# Patient Record
Sex: MALE | Race: WHITE | NOT HISPANIC OR LATINO
[De-identification: names, ages, dates, MRNs, and addresses within clinical notes are randomized per-mention and may not be internally consistent; named-entity substitution may affect disease eponyms.]

---

## 2019-03-14 ENCOUNTER — APPOINTMENT (OUTPATIENT)
Dept: UROLOGY | Facility: CLINIC | Age: 74
End: 2019-03-14
Payer: COMMERCIAL

## 2019-03-14 VITALS
WEIGHT: 181 LBS | DIASTOLIC BLOOD PRESSURE: 78 MMHG | TEMPERATURE: 97.7 F | BODY MASS INDEX: 30.9 KG/M2 | SYSTOLIC BLOOD PRESSURE: 162 MMHG | HEIGHT: 64 IN | HEART RATE: 69 BPM

## 2019-03-14 DIAGNOSIS — Z87.891 PERSONAL HISTORY OF NICOTINE DEPENDENCE: ICD-10-CM

## 2019-03-14 PROCEDURE — 99204 OFFICE O/P NEW MOD 45 MIN: CPT

## 2019-03-14 RX ORDER — SIMVASTATIN 80 MG/1
TABLET, FILM COATED ORAL
Refills: 0 | Status: ACTIVE | COMMUNITY

## 2019-03-15 LAB — BACTERIA UR CULT: NORMAL

## 2019-03-24 NOTE — ADDENDUM
[FreeTextEntry1] : Entered by Pam Chino, acting as scribe for Dr. Muñoz. \par \par The documentation recorded by the scribe accurately reflects the service I personally performed and the decisions made by me.\par

## 2019-03-24 NOTE — LETTER BODY
[FreeTextEntry1] : Jerome White MD. \par [address]\[place] [address]\par [phone]\par \par Dear Dr. White,\par \par Reason for Visit: Elevated PSA.\par \par This is a 73 -year-old retired gentleman with elevated PSA. Patient reports that he has not had previous prostate biopsy. His current PSA is 4.94. He reports a fast urinary flow. Patient denies any hematuria or lower urinary tract symptoms. He denies any pain.The patient denies any aggravating or relieving factors. The patient denies any interference of function. The patient is entirely asymptomatic. All other review of systems are negative. He has no cancer in his family medical history. He has previous surgical history. Past medical history, family history and social history were inquired and were non contributory to current condition. The patient was a former smoker. The patient drinks alcohol occasionally. Medications and allergies were reviewed. Patient is allergic to Penicillin and Sulfa. \par \par On examination, the patient is a healthy-appearing gentleman in no acute distress. He is alert and oriented follows commands. He has normal mood and affect. He is normocephalic. Neck is supple. Oral no thrush. Respirations are unlabored. His abdomen is soft and nontender. Bladder is nonpalpable. No CVA tenderness. Neurologically he is grossly intact. No peripheral edema. Skin without gross abnormality. He has normal male external genitalia. Normal meatus. Bilateral testes are descended intrascrotally and normal to palpation. On rectal examination, there is normal sphincter tone. The prostate is clinically benign without focal induration or nodularity.\par \par His current PSA is 4.94, which is elevated. Previous PSA was 3.72. CMP demonstrated normal renal function, creatinine 1.11.\par \par Assessment: Elevated PSA.\par \par I counseled the patient. I discussed with him the risk of occult malignancy. I discussed the various etiologies of PSA elevation, including enlargement of prostate, inflammation or infection, and prostate cancer. He will obtain ALP, BMP, and urine culture today to ensure stability. I recommended he repeat PSA to confirm the diagnosis. If his PSA remains elevated, then he may then consider a prostate MRI or biopsy. Risks and alternatives were discussed. I answered the patient questions. The patient will follow-up as directed and will contact me with any questions or concerns.Thank you for the opportunity to participate in the care of this patient. I'll keep you updated on his progress.\par \par Plan: ALP. BMP. PSA. Urine culture. Follow up in 6 months.

## 2019-03-28 ENCOUNTER — TRANSCRIPTION ENCOUNTER (OUTPATIENT)
Age: 74
End: 2019-03-28

## 2019-08-08 ENCOUNTER — APPOINTMENT (OUTPATIENT)
Dept: UROLOGY | Facility: CLINIC | Age: 74
End: 2019-08-08
Payer: COMMERCIAL

## 2019-08-08 PROCEDURE — 99213 OFFICE O/P EST LOW 20 MIN: CPT

## 2019-08-08 NOTE — PHYSICAL EXAM
[General Appearance - Well Nourished] : well nourished [General Appearance - Well Developed] : well developed [Normal Appearance] : normal appearance [Well Groomed] : well groomed [General Appearance - In No Acute Distress] : no acute distress [Abdomen Soft] : soft [Costovertebral Angle Tenderness] : no ~M costovertebral angle tenderness [Abdomen Tenderness] : non-tender [Urinary Bladder Findings] : the bladder was normal on palpation [Urethral Meatus] : meatus normal [No Prostate Nodules] : no prostate nodules [Testes Mass (___cm)] : there were no testicular masses [Scrotum] : the scrotum was normal [Edema] : no peripheral edema [] : no respiratory distress [Exaggerated Use Of Accessory Muscles For Inspiration] : no accessory muscle use [Respiration, Rhythm And Depth] : normal respiratory rhythm and effort [Oriented To Time, Place, And Person] : oriented to person, place, and time [Affect] : the affect was normal [Mood] : the mood was normal [Not Anxious] : not anxious [Normal Station and Gait] : the gait and station were normal for the patient's age [No Focal Deficits] : no focal deficits [No Palpable Adenopathy] : no palpable adenopathy

## 2019-08-29 ENCOUNTER — CLINICAL ADVICE (OUTPATIENT)
Age: 74
End: 2019-08-29

## 2019-08-31 NOTE — ADDENDUM
[FreeTextEntry1] : Entered by Jasmin Byrd, acting as scribe for Dr. Derrick Muñoz.\par \par The documentation recorded by the scribe accurately reflects the service I personally performed and the decisions made by me.

## 2019-08-31 NOTE — LETTER BODY
[FreeTextEntry1] : Jerome White MD. \par [address]\[place] [address]\par [phone]\par \par Dear Dr. White,\par \par Reason for Visit: Elevated PSA.\par \par This is a 74 -year-old retired gentleman with elevated PSA. Patient reports that he has not had previous prostate biopsy. His previous PSA is 4.94. He reports a fast urinary flow. Patient denies any hematuria or lower urinary tract symptoms. He denies any pain.The patient denies any aggravating or relieving factors. The patient denies any interference of function. The patient is entirely asymptomatic. All other review of systems are negative. He has no cancer in his family medical history. He has previous surgical history. Past medical history, family history and social history were inquired and were non contributory to current condition. The patient was a former smoker. The patient drinks alcohol occasionally. Medications and allergies were reviewed. Patient is allergic to Penicillin and Sulfa. \par \par On examination, the patient is a healthy-appearing gentleman in no acute distress. He is alert and oriented follows commands. He has normal mood and affect. He is normocephalic. Neck is supple. Oral no thrush. Respirations are unlabored. His abdomen is soft and nontender. Bladder is nonpalpable. No CVA tenderness. Neurologically he is grossly intact. No peripheral edema. Skin without gross abnormality. He has normal male external genitalia. Normal meatus. Bilateral testes are descended intrascrotally and normal to palpation. On rectal examination, there is normal sphincter tone. The prostate is clinically benign without focal induration or nodularity.\par \par His current PSA is 6.44 and creatinine is 1.11.\par His previous PSA is 4.94, which is elevated. CMP demonstrated normal renal function, creatinine 1.11.\par \par Assessment: Elevated PSA.\par \par I counseled the patient. I discussed with him the risk of occult malignancy. I discussed the various etiologies of PSA elevation, including enlargement of prostate, inflammation or infection, and prostate cancer. He will obtain ALP, BMP, and urine culture today to ensure stability. I recommended he repeat PSA to confirm the diagnosis. If his PSA remains elevated, then he may then consider a prostate MRI or biopsy. Risks and alternatives were discussed. I answered the patient questions. The patient will follow-up as directed and will contact me with any questions or concerns.Thank you for the opportunity to participate in the care of this patient. I'll keep you updated on his progress.\par \par Plan: ALP. BMP. PSA. Urine culture. Follow up in 6 months.

## 2019-09-16 ENCOUNTER — OUTPATIENT (OUTPATIENT)
Dept: OUTPATIENT SERVICES | Facility: HOSPITAL | Age: 74
LOS: 1 days | End: 2019-09-16
Payer: COMMERCIAL

## 2019-09-16 ENCOUNTER — APPOINTMENT (OUTPATIENT)
Dept: UROLOGY | Facility: CLINIC | Age: 74
End: 2019-09-16
Payer: COMMERCIAL

## 2019-09-16 VITALS
HEART RATE: 79 BPM | HEIGHT: 64 IN | TEMPERATURE: 97.5 F | SYSTOLIC BLOOD PRESSURE: 163 MMHG | DIASTOLIC BLOOD PRESSURE: 83 MMHG | WEIGHT: 181 LBS | BODY MASS INDEX: 30.9 KG/M2 | RESPIRATION RATE: 16 BRPM

## 2019-09-16 DIAGNOSIS — R35.0 FREQUENCY OF MICTURITION: ICD-10-CM

## 2019-09-16 PROCEDURE — 76942 ECHO GUIDE FOR BIOPSY: CPT | Mod: 26,59

## 2019-09-16 PROCEDURE — 76942 ECHO GUIDE FOR BIOPSY: CPT | Mod: 59

## 2019-09-16 PROCEDURE — 76872 US TRANSRECTAL: CPT | Mod: 26

## 2019-09-16 PROCEDURE — 55700: CPT

## 2019-09-16 PROCEDURE — 76872 US TRANSRECTAL: CPT

## 2019-09-19 DIAGNOSIS — J98.19 OTHER PULMONARY COLLAPSE: ICD-10-CM

## 2019-09-19 DIAGNOSIS — R97.20 ELEVATED PROSTATE SPECIFIC ANTIGEN [PSA]: ICD-10-CM

## 2019-09-20 LAB — CORE LAB BIOPSY: NORMAL

## 2019-09-30 ENCOUNTER — APPOINTMENT (OUTPATIENT)
Dept: UROLOGY | Facility: CLINIC | Age: 74
End: 2019-09-30
Payer: COMMERCIAL

## 2019-09-30 PROCEDURE — 99214 OFFICE O/P EST MOD 30 MIN: CPT

## 2019-09-30 NOTE — LETTER BODY
[FreeTextEntry1] : Jerome White MD. \par [address]\[place] [address]\par [phone]\par \par Dear Dr. White,\par \par Reason for Visit: Elevated PSA.\par \par This is a 74 -year-old retired gentleman with elevated PSA.  His previous PSA is 6.44. Patient returns for follow-up. Since his last visit, patient's biopsy returned positive for 2 of 12 cores, lobo 7. The patient denies any aggravating or relieving factors. The patient denies any interference of function. The patient is entirely asymptomatic. All other review of systems are negative. He has no cancer in his family medical history. He has previous surgical history. Past medical history, family history and social history were unchanged. Medications and allergies were reviewed. Patient is allergic to Penicillin and Sulfa. \par \par On examination, the patient is a healthy-appearing gentleman in no acute distress. He is alert and oriented follows commands. He has normal mood and affect. He is normocephalic. Neck is supple. Oral no thrush. Respirations are unlabored. His abdomen is soft and nontender. Bladder is nonpalpable. No CVA tenderness. Neurologically he is grossly intact. No peripheral edema. Skin without gross abnormality. He has normal male external genitalia. Normal meatus. Bilateral testes are descended intrascrotally and normal to palpation. On rectal examination, there is normal sphincter tone. The prostate is clinically benign without focal induration or nodularity.\par \par His current PSA is 6.44 and creatinine is 1.11. His previous PSA is 4.94, which is elevated. CMP demonstrated normal renal function, creatinine 1.11.\par \par Assessment: Elevated PSA. Prostate Cancer.\par \par I counseled the patient. His biopsy returned positive for 2 of 12 cores, Lobo 7. I discussed treatment options for his prostate cancer. I advised him to consider prostate surgery, radiation therapy, or active surveillance. I counseled the patient regarding each procedure. The risks and benefits were discussed. Alternatives were given. I answered the patient questions. The patient will follow-up as directed and will contact me with any questions or concerns.Thank you for the opportunity to participate in the care of this patient. I'll keep you updated on his progress.\par \par Plan: Consider treatment options. Follow up as directed.

## 2019-09-30 NOTE — ADDENDUM
[FreeTextEntry1] : Entered by Denver Calderon, acting as scribe for Dr. Derrick Muñoz.\par \par The documentation recorded by the scribe accurately reflects the service I personally performed and the decisions made by me.

## 2019-10-04 ENCOUNTER — OUTPATIENT (OUTPATIENT)
Dept: OUTPATIENT SERVICES | Facility: HOSPITAL | Age: 74
LOS: 1 days | Discharge: HOME | End: 2019-10-04
Payer: COMMERCIAL

## 2019-10-08 ENCOUNTER — APPOINTMENT (OUTPATIENT)
Dept: RADIATION ONCOLOGY | Facility: CLINIC | Age: 74
End: 2019-10-08
Payer: COMMERCIAL

## 2019-10-08 VITALS
DIASTOLIC BLOOD PRESSURE: 84 MMHG | BODY MASS INDEX: 31.81 KG/M2 | TEMPERATURE: 98.06 F | OXYGEN SATURATION: 97 % | HEART RATE: 72 BPM | RESPIRATION RATE: 16 BRPM | WEIGHT: 185.3 LBS | SYSTOLIC BLOOD PRESSURE: 150 MMHG

## 2019-10-08 PROCEDURE — 99204 OFFICE O/P NEW MOD 45 MIN: CPT | Mod: 25,GC

## 2019-10-08 RX ORDER — ENEMA 19; 7 G/133ML; G/133ML
7-19 ENEMA RECTAL
Qty: 1 | Refills: 0 | Status: DISCONTINUED | COMMUNITY
Start: 2019-08-08 | End: 2019-10-08

## 2019-10-08 RX ORDER — CIPROFLOXACIN HYDROCHLORIDE 500 MG/1
500 TABLET, FILM COATED ORAL
Qty: 6 | Refills: 0 | Status: DISCONTINUED | COMMUNITY
Start: 2019-08-08 | End: 2019-10-08

## 2019-10-20 NOTE — PHYSICAL EXAM
[Sclera] : the sclera and conjunctiva were normal [Outer Ear] : the ears and nose were normal in appearance [Abdomen Soft] : soft [Abdomen Tenderness] : non-tender [] : no hepato-splenomegaly [No Rectal Mass] : no rectal mass [No Prostate Nodules] : no prostate nodules [Normal] : no palpable adenopathy [Motor Tone] : muscle strength and tone were normal [Skin Color & Pigmentation] : normal skin color and pigmentation [No Focal Deficits] : no focal deficits

## 2019-10-20 NOTE — REVIEW OF SYSTEMS
[Negative] : Heme/Lymph [Diarrhea: Grade 0] : Diarrhea: Grade 0 [Constipation: Grade 0] : Constipation: Grade 0 [Nausea: Grade 0] : Nausea: Grade 0 [Vomiting: Grade 0] : Vomiting: Grade 0 [Urinary Retention: Grade 0] : Urinary Retention: Grade 0 [Urinary Tract Pain: Grade 0] : Urinary Tract Pain: Grade 0 [Urinary Urgency: Grade 1 - Present] : Urinary Urgency: Grade 1 - Present [Urinary Frequency: Grade 1 - Present] : Urinary Frequency: Grade 1 - Present [Patient Intake Form Reviewed] : Patient intake form was reviewed [IPSS Score (0-40): ___] : IPSS score: [unfilled] [EPIC-CP Score (0-60): ___] : EPIC-CP score: [unfilled] [FreeTextEntry1] : Penicillin, sulfa

## 2019-10-20 NOTE — DISEASE MANAGEMENT
[1] : T1 [c] : c [0] : M0 [7(3+4)] : Scottie Score 7(3+4) [IIB] : IIB [BiopsyDate] : 09/19 [MeasuredProstateVolume] : 27 [TotalCores] : 12 [TotalPositiveCores] : 2 [MaxCoreInvolvement] : 80

## 2019-10-20 NOTE — HISTORY OF PRESENT ILLNESS
[FreeTextEntry1] : Mr. Aceves is a 74 year old man with a diagnosis of a [id number], [place] 3+4=7, pre-treatment PSA of 6.44 ng/mL, NCCN favorable intermediate risk prostate cancer.\par \par He presented to Dr. Muñoz on 3/14/19 with a PSA of 4.94 ng/mL (measured 2/21/19) entirely asymptomatic.  His previous PSA was 3.72 ng/mL.  Plan was for repeat PSA.  His IPSS was 3 at this visit.\par \par Repeat PSA on 7/31/19 was 6.44 ng/mL.\par \par He saw Dr. Muñoz on 8/8/19 and then underwent 12 core biopsy on 9/16/19.  In the right apex biopsy, 2 of 2 cores were positive for Scottie 3+4=7 disease involving 80% and 50% of the cores.  The other core biopsies from the right mid, right base, left apex, left mid, and left base were negative.  His prostate volume was 27 cc.\par \par He follows up with Dr. Muñoz on 9/30/19 to discuss treatment options.\par \par He presents for consultation today.  He has some nocturia but otherwise denies urinary bother.  He has no bowel issues.  He is sexually active and able to achieve/maintain an erection.

## 2019-10-23 PROCEDURE — 77263 THER RADIOLOGY TX PLNG CPLX: CPT

## 2019-10-24 ENCOUNTER — OUTPATIENT (OUTPATIENT)
Dept: OUTPATIENT SERVICES | Facility: HOSPITAL | Age: 74
LOS: 1 days | End: 2019-10-24
Payer: COMMERCIAL

## 2019-10-24 VITALS
SYSTOLIC BLOOD PRESSURE: 140 MMHG | DIASTOLIC BLOOD PRESSURE: 96 MMHG | WEIGHT: 179.9 LBS | HEIGHT: 62 IN | OXYGEN SATURATION: 98 % | HEART RATE: 82 BPM | RESPIRATION RATE: 16 BRPM

## 2019-10-24 DIAGNOSIS — Z90.49 ACQUIRED ABSENCE OF OTHER SPECIFIED PARTS OF DIGESTIVE TRACT: Chronic | ICD-10-CM

## 2019-10-24 DIAGNOSIS — Z98.890 OTHER SPECIFIED POSTPROCEDURAL STATES: Chronic | ICD-10-CM

## 2019-10-24 DIAGNOSIS — C61 MALIGNANT NEOPLASM OF PROSTATE: ICD-10-CM

## 2019-10-24 DIAGNOSIS — R03.0 ELEVATED BLOOD-PRESSURE READING, WITHOUT DIAGNOSIS OF HYPERTENSION: ICD-10-CM

## 2019-10-24 DIAGNOSIS — Z87.09 PERSONAL HISTORY OF OTHER DISEASES OF THE RESPIRATORY SYSTEM: Chronic | ICD-10-CM

## 2019-10-24 LAB
ALBUMIN SERPL ELPH-MCNC: 4.7 G/DL — SIGNIFICANT CHANGE UP (ref 3.3–5)
ALP SERPL-CCNC: 49 U/L — SIGNIFICANT CHANGE UP (ref 40–120)
ALT FLD-CCNC: 18 U/L — SIGNIFICANT CHANGE UP (ref 4–41)
ANION GAP SERPL CALC-SCNC: 12 MMO/L — SIGNIFICANT CHANGE UP (ref 7–14)
AST SERPL-CCNC: 20 U/L — SIGNIFICANT CHANGE UP (ref 4–40)
BILIRUB SERPL-MCNC: 0.4 MG/DL — SIGNIFICANT CHANGE UP (ref 0.2–1.2)
BUN SERPL-MCNC: 26 MG/DL — HIGH (ref 7–23)
CALCIUM SERPL-MCNC: 10.1 MG/DL — SIGNIFICANT CHANGE UP (ref 8.4–10.5)
CHLORIDE SERPL-SCNC: 101 MMOL/L — SIGNIFICANT CHANGE UP (ref 98–107)
CO2 SERPL-SCNC: 26 MMOL/L — SIGNIFICANT CHANGE UP (ref 22–31)
CREAT SERPL-MCNC: 1.21 MG/DL — SIGNIFICANT CHANGE UP (ref 0.5–1.3)
GLUCOSE SERPL-MCNC: 68 MG/DL — LOW (ref 70–99)
HCT VFR BLD CALC: 52 % — HIGH (ref 39–50)
HGB BLD-MCNC: 17.7 G/DL — HIGH (ref 13–17)
MCHC RBC-ENTMCNC: 30.1 PG — SIGNIFICANT CHANGE UP (ref 27–34)
MCHC RBC-ENTMCNC: 34 % — SIGNIFICANT CHANGE UP (ref 32–36)
MCV RBC AUTO: 88.4 FL — SIGNIFICANT CHANGE UP (ref 80–100)
NRBC # FLD: 0 K/UL — SIGNIFICANT CHANGE UP (ref 0–0)
PLATELET # BLD AUTO: 203 K/UL — SIGNIFICANT CHANGE UP (ref 150–400)
PMV BLD: 11.6 FL — SIGNIFICANT CHANGE UP (ref 7–13)
POTASSIUM SERPL-MCNC: 4.5 MMOL/L — SIGNIFICANT CHANGE UP (ref 3.5–5.3)
POTASSIUM SERPL-SCNC: 4.5 MMOL/L — SIGNIFICANT CHANGE UP (ref 3.5–5.3)
PROT SERPL-MCNC: 8 G/DL — SIGNIFICANT CHANGE UP (ref 6–8.3)
RBC # BLD: 5.88 M/UL — HIGH (ref 4.2–5.8)
RBC # FLD: 12.1 % — SIGNIFICANT CHANGE UP (ref 10.3–14.5)
SODIUM SERPL-SCNC: 139 MMOL/L — SIGNIFICANT CHANGE UP (ref 135–145)
WBC # BLD: 7.43 K/UL — SIGNIFICANT CHANGE UP (ref 3.8–10.5)
WBC # FLD AUTO: 7.43 K/UL — SIGNIFICANT CHANGE UP (ref 3.8–10.5)

## 2019-10-24 PROCEDURE — 93010 ELECTROCARDIOGRAM REPORT: CPT

## 2019-10-24 RX ORDER — SIMVASTATIN 20 MG/1
1 TABLET, FILM COATED ORAL
Qty: 0 | Refills: 0 | DISCHARGE

## 2019-10-24 NOTE — H&P PST ADULT - HISTORY OF PRESENT ILLNESS
73 y/o male with hx elevated PSA in past 6 months.  Prostate biopsy was done, and malignant. Pt will be treated  having radiation tx.  Now scheduled for Transperineal placement of Spaceoar gel and markers 11/4/19. 75 y/o male with hx elevated PSA in past 6 months.  Prostate biopsy was done, and malignant. Pt will be having radiation tx.  Now scheduled for Transperineal placement of Spaceoar gel and markers 11/4/19.

## 2019-10-24 NOTE — H&P PST ADULT - NSANTHOSAYNRD_GEN_A_CORE
No. SUSY screening performed.  STOP BANG Legend: 0-2 = LOW Risk; 3-4 = INTERMEDIATE Risk; 5-8 = HIGH Risk

## 2019-10-24 NOTE — H&P PST ADULT - NSICDXPASTMEDICALHX_GEN_ALL_CORE_FT
PAST MEDICAL HISTORY:  Hyperlipidemia     Prostate cancer PAST MEDICAL HISTORY:  Hyperlipidemia     Obesity     Prostate cancer

## 2019-10-24 NOTE — H&P PST ADULT - ASSESSMENT
75 y/o male with hx elevated PSA in past 6 months.  Prostate biopsy was done, and malignant. Pt will be treated  having radiation tx.  Now scheduled for Transperineal placement of Spaceoar gel and markers 11/4/19. 75 y/o male with hx elevated PSA in past 6 months.  Prostate biopsy was done, and malignant. Pt will be having radiation tx.  Now scheduled for Transperineal placement of Spaceoar gel and markers 11/4/19.

## 2019-10-24 NOTE — H&P PST ADULT - ANESTHESIA, PREVIOUS REACTION, PROFILE
Pt reports that he awakened too early during his hernia surgery 2014 and could hear everything being said, but couldn't move.   Pt reports they had taken tube out, and then needed to reintubate as he was still paralyzed from anesthesia

## 2019-10-24 NOTE — H&P PST ADULT - NSICDXPROBLEM_GEN_ALL_CORE_FT
PROBLEM DIAGNOSES  Problem: Prostate cancer  Assessment and Plan: Transperineal placement of Spaceoar gel and markers 11/4/19.   CBC CMP EKG  Pre-op instructions given and explained    Problem: Elevated BP without diagnosis of hypertension  Assessment and Plan: Pt advised to see PMD for eval of elevated /96 at PST, requested on chart PROBLEM DIAGNOSES  Problem: Prostate cancer  Assessment and Plan: Transperineal placement of Spaceoar gel and markers 11/4/19.   CBC CMP EKG  Pre-op instructions given and explained  SUSY precautions, OR booking informed.  PCN and Sulfa allergy , OR booking informed    Problem: Elevated BP without diagnosis of hypertension  Assessment and Plan: Pt advised to see PMD for eval of elevated /96 at PST, requested on chart

## 2019-10-24 NOTE — H&P PST ADULT - NSICDXPASTSURGICALHX_GEN_ALL_CORE_FT
PAST SURGICAL HISTORY:  H/O pneumothorax spontaneous 1976 x2    History of cholecystectomy 2008    History of hernia repair abdominal hernia repair 2014 [facility] PAST SURGICAL HISTORY:  H/O pneumothorax spontaneous 1976 x2. Chest tube was placed    History of cholecystectomy 2008    History of hernia repair abdominal hernia repair 2014 [facility]

## 2019-10-25 PROBLEM — C61 MALIGNANT NEOPLASM OF PROSTATE: Chronic | Status: ACTIVE | Noted: 2019-10-24

## 2019-10-25 PROBLEM — E78.5 HYPERLIPIDEMIA, UNSPECIFIED: Chronic | Status: ACTIVE | Noted: 2019-10-24

## 2019-10-30 ENCOUNTER — OUTPATIENT (OUTPATIENT)
Dept: OUTPATIENT SERVICES | Facility: HOSPITAL | Age: 74
LOS: 1 days | Discharge: HOME | End: 2019-10-30
Payer: COMMERCIAL

## 2019-10-30 DIAGNOSIS — Z90.49 ACQUIRED ABSENCE OF OTHER SPECIFIED PARTS OF DIGESTIVE TRACT: Chronic | ICD-10-CM

## 2019-10-30 DIAGNOSIS — Z98.890 OTHER SPECIFIED POSTPROCEDURAL STATES: Chronic | ICD-10-CM

## 2019-10-30 DIAGNOSIS — Z87.09 PERSONAL HISTORY OF OTHER DISEASES OF THE RESPIRATORY SYSTEM: Chronic | ICD-10-CM

## 2019-10-30 PROBLEM — E66.9 OBESITY, UNSPECIFIED: Chronic | Status: ACTIVE | Noted: 2019-10-24

## 2019-11-14 ENCOUNTER — APPOINTMENT (OUTPATIENT)
Dept: MRI IMAGING | Facility: IMAGING CENTER | Age: 74
End: 2019-11-14

## 2019-11-25 NOTE — H&P PST ADULT - DOES THIS PATIENT HAVE A HISTORY OF OR HAS BEEN DX WITH HEART FAILURE?
bowel regimen as distended bowel has extrinsic compressive effect on bladder.   - any or all of the following in any combination, titrate to soft daily bowel movement without pushing or straining  - colace/stool softener capsule - once to twice daily  - miralax - 1 capful daily to start, can increase to 2x daily (or decrease to 1/2 cap daily)  - increase dietary fibery  - fiber supplements, such as metamucil  - prunes, prune juice    Start flomax 0.4mg nightly         unknown

## 2019-11-26 ENCOUNTER — OUTPATIENT (OUTPATIENT)
Dept: OUTPATIENT SERVICES | Facility: HOSPITAL | Age: 74
LOS: 1 days | End: 2019-11-26

## 2019-11-26 VITALS
HEIGHT: 62.5 IN | OXYGEN SATURATION: 100 % | WEIGHT: 181 LBS | DIASTOLIC BLOOD PRESSURE: 82 MMHG | SYSTOLIC BLOOD PRESSURE: 154 MMHG | TEMPERATURE: 98 F | HEART RATE: 70 BPM | RESPIRATION RATE: 14 BRPM

## 2019-11-26 DIAGNOSIS — C61 MALIGNANT NEOPLASM OF PROSTATE: ICD-10-CM

## 2019-11-26 DIAGNOSIS — I10 ESSENTIAL (PRIMARY) HYPERTENSION: ICD-10-CM

## 2019-11-26 DIAGNOSIS — Z87.09 PERSONAL HISTORY OF OTHER DISEASES OF THE RESPIRATORY SYSTEM: Chronic | ICD-10-CM

## 2019-11-26 DIAGNOSIS — Z98.890 OTHER SPECIFIED POSTPROCEDURAL STATES: Chronic | ICD-10-CM

## 2019-11-26 DIAGNOSIS — Z90.49 ACQUIRED ABSENCE OF OTHER SPECIFIED PARTS OF DIGESTIVE TRACT: Chronic | ICD-10-CM

## 2019-11-26 DIAGNOSIS — R06.83 SNORING: ICD-10-CM

## 2019-11-26 LAB
ANION GAP SERPL CALC-SCNC: 13 MMO/L — SIGNIFICANT CHANGE UP (ref 7–14)
BASOPHILS # BLD AUTO: 0.05 K/UL — SIGNIFICANT CHANGE UP (ref 0–0.2)
BASOPHILS NFR BLD AUTO: 0.8 % — SIGNIFICANT CHANGE UP (ref 0–2)
BUN SERPL-MCNC: 19 MG/DL — SIGNIFICANT CHANGE UP (ref 7–23)
CALCIUM SERPL-MCNC: 9.6 MG/DL — SIGNIFICANT CHANGE UP (ref 8.4–10.5)
CHLORIDE SERPL-SCNC: 101 MMOL/L — SIGNIFICANT CHANGE UP (ref 98–107)
CO2 SERPL-SCNC: 24 MMOL/L — SIGNIFICANT CHANGE UP (ref 22–31)
CREAT SERPL-MCNC: 1.17 MG/DL — SIGNIFICANT CHANGE UP (ref 0.5–1.3)
EOSINOPHIL # BLD AUTO: 0.21 K/UL — SIGNIFICANT CHANGE UP (ref 0–0.5)
EOSINOPHIL NFR BLD AUTO: 3.3 % — SIGNIFICANT CHANGE UP (ref 0–6)
GLUCOSE SERPL-MCNC: 71 MG/DL — SIGNIFICANT CHANGE UP (ref 70–99)
HCT VFR BLD CALC: 52.4 % — HIGH (ref 39–50)
HGB BLD-MCNC: 17.2 G/DL — HIGH (ref 13–17)
IMM GRANULOCYTES NFR BLD AUTO: 0.5 % — SIGNIFICANT CHANGE UP (ref 0–1.5)
LYMPHOCYTES # BLD AUTO: 1.74 K/UL — SIGNIFICANT CHANGE UP (ref 1–3.3)
LYMPHOCYTES # BLD AUTO: 27.6 % — SIGNIFICANT CHANGE UP (ref 13–44)
MCHC RBC-ENTMCNC: 29.3 PG — SIGNIFICANT CHANGE UP (ref 27–34)
MCHC RBC-ENTMCNC: 32.8 % — SIGNIFICANT CHANGE UP (ref 32–36)
MCV RBC AUTO: 89.1 FL — SIGNIFICANT CHANGE UP (ref 80–100)
MONOCYTES # BLD AUTO: 0.66 K/UL — SIGNIFICANT CHANGE UP (ref 0–0.9)
MONOCYTES NFR BLD AUTO: 10.5 % — SIGNIFICANT CHANGE UP (ref 2–14)
NEUTROPHILS # BLD AUTO: 3.62 K/UL — SIGNIFICANT CHANGE UP (ref 1.8–7.4)
NEUTROPHILS NFR BLD AUTO: 57.3 % — SIGNIFICANT CHANGE UP (ref 43–77)
NRBC # FLD: 0 K/UL — SIGNIFICANT CHANGE UP (ref 0–0)
PLATELET # BLD AUTO: 212 K/UL — SIGNIFICANT CHANGE UP (ref 150–400)
PMV BLD: 12.1 FL — SIGNIFICANT CHANGE UP (ref 7–13)
POTASSIUM SERPL-MCNC: 4.1 MMOL/L — SIGNIFICANT CHANGE UP (ref 3.5–5.3)
POTASSIUM SERPL-SCNC: 4.1 MMOL/L — SIGNIFICANT CHANGE UP (ref 3.5–5.3)
RBC # BLD: 5.88 M/UL — HIGH (ref 4.2–5.8)
RBC # FLD: 12.1 % — SIGNIFICANT CHANGE UP (ref 10.3–14.5)
SODIUM SERPL-SCNC: 138 MMOL/L — SIGNIFICANT CHANGE UP (ref 135–145)
WBC # BLD: 6.31 K/UL — SIGNIFICANT CHANGE UP (ref 3.8–10.5)
WBC # FLD AUTO: 6.31 K/UL — SIGNIFICANT CHANGE UP (ref 3.8–10.5)

## 2019-11-26 RX ORDER — AMLODIPINE BESYLATE 2.5 MG/1
1 TABLET ORAL
Qty: 0 | Refills: 0 | DISCHARGE

## 2019-11-26 RX ORDER — IBUPROFEN 200 MG
1 TABLET ORAL
Qty: 0 | Refills: 0 | DISCHARGE

## 2019-11-26 NOTE — H&P PST ADULT - NEGATIVE LYMPHATIC SYMPTOMS
no tender lymph nodes/no enlarged lymph nodes no swelling of extremity/no enlarged lymph nodes/no tender lymph nodes

## 2019-11-26 NOTE — H&P PST ADULT - ANESTHESIA, PREVIOUS REACTION, PROFILE
Pt reports that he awakened too early during his hernia surgery in 2014 and could hear everything being said, but couldn't move.  Pt reports they had taken tube out, and then needed to reintubate as he was still paralyzed from anesthesia. Denies fhx of anesthesia reactions

## 2019-11-26 NOTE — H&P PST ADULT - RS GEN PE MLT RESP DETAILS PC
no rales/clear to auscultation bilaterally/no rhonchi/no wheezes/respirations non-labored no rales/airway patent/clear to auscultation bilaterally/breath sounds equal/no chest wall tenderness/no rhonchi/no wheezes/respirations non-labored

## 2019-11-26 NOTE — H&P PST ADULT - NSICDXPASTMEDICALHX_GEN_ALL_CORE_FT
PAST MEDICAL HISTORY:  Elevated PSA     HTN (hypertension)     Hyperlipidemia     Obesity     Prostate cancer

## 2019-11-26 NOTE — H&P PST ADULT - MUSCULOSKELETAL
negative No joint pain, swelling or deformity; no limitation of movement detailed exam normal strength/ROM intact

## 2019-11-26 NOTE — H&P PST ADULT - NEGATIVE CARDIOVASCULAR SYMPTOMS
no chest pain/no dyspnea on exertion/no palpitations/no peripheral edema no palpitations/no chest pain/no peripheral edema/no paroxysmal nocturnal dyspnea/no dyspnea on exertion

## 2019-11-26 NOTE — H&P PST ADULT - NEGATIVE GASTROINTESTINAL SYMPTOMS
no nausea/no constipation/no abdominal pain/no vomiting/no diarrhea no constipation/no diarrhea/no abdominal pain/no nausea/no change in bowel habits/no vomiting

## 2019-11-26 NOTE — H&P PST ADULT - NSICDXPASTSURGICALHX_GEN_ALL_CORE_FT
PAST SURGICAL HISTORY:  H/O pneumothorax spontaneous 1976 x2. Chest tube was placed    History of cholecystectomy 2008    History of hernia repair abdominal hernia repair 2014 [facility]    S/P appendectomy

## 2019-11-26 NOTE — H&P PST ADULT - NSICDXPROBLEM_GEN_ALL_CORE_FT
PROBLEM DIAGNOSES  Problem: Malignant neoplasm of prostate  Assessment and Plan: Scheduled for Transperineal Placement of Spaceoar Gel and Markers on 12/3/2019  Preop instructions, GI prophylaxis and Chlorhexidine wash provided- pt verbalized understanding     Problem: HTN (hypertension)  Assessment and Plan: Pt newly diagnosed with HTN and started on Amlodipine 5mg daily   He will take BP meds on day of surgery with sips of water  Medical clearance requested (new onset HTN)    Problem: Snoring  Assessment and Plan: SUSY precautions   OR booking notified via fax

## 2019-11-26 NOTE — H&P PST ADULT - HISTORY OF PRESENT ILLNESS
75 y/o male presents to PST for preoperative evaluation with dx of malignant neoplasm of prostate. Scheduled for Transperineal Placement of Spaceoar Gel and Markers on 12/3/2019. Pt was scheduled for surgery in early November but case eas cancelled due to elevated BP reading on day of procedure. Started on 10mg of Amlodipine daily. 75 y/o male presents to PST for preoperative evaluation with dx of malignant neoplasm of prostate. Scheduled for Transperineal Placement of Spaceoar Gel and Markers on 12/3/2019. Pt was scheduled for surgery in early November but case was cancelled due to elevated BP reading on day of procedure. Started on 5mg of Amlodipine daily.

## 2019-12-03 PROCEDURE — 76872 US TRANSRECTAL: CPT | Mod: 26

## 2019-12-03 PROCEDURE — 55874 TPRNL PLMT BIODEGRDABL MATRL: CPT

## 2019-12-03 PROCEDURE — 55876 PLACE RT DEVICE/MARKER PROS: CPT

## 2019-12-03 NOTE — REASON FOR VISIT
[Spouse] : spouse [FreeTextEntry2] : Mr. Aceves presents for Transperineal Placement of Space OAR Hydrogel

## 2019-12-03 NOTE — DATA REVIEWED
[FreeTextEntry1] : Mr. Aceves is a 74 year old man with a diagnosis of a [id number], [place] 3+4=7, pre-treatment PSA of 6.44 ng/mL, NCCN favorable intermediate risk prostate cancer.\par \par He presents here today for transperineal placement of Space OAR Hydrogel and fiducial placement in preparation for radiation therapy for his prostate cancer treatment.\par \par \par Procedure Note:\par In preparation for the procedure, he self-administered an enema one hour before leaving home and was NPO the night before procedure. He took ciprofloxacin 500mg BID on 12/2/2019 and a dose today prior to his procedure. Procedure risk and benefits were reviewed with patient and a written consent was obtained prior to procedure. A time out was observed with patient name, date of birth, procedure, position, and site verified. Anesthesia was provided by the Anesthesia Department.\par \par Patient was placed in a lithotomy position. Povidone-iodine was used to prep the skin. While maintaining aseptic technique an ultrasound probe was inserted into the rectum to visualize the prostate. Lidocaine 2% was infused into the perineal area. Three fiducial markers were prepared on the sterile field. One fiducial marker was placed into each of the following sites: left lobe, right lobe and apex via 14 gauge needles under ultrasound guidance. \par \par Next, the hydrogel spacer kit was opened onto the sterile field and the hydrogel injection apparatus was prepared. An 18 gauge needle was positioned into the mid-line perirectal fat between the anterior rectal wall and prostate under ultrasound guidance. Less than 10 cc of saline was injected via the needle to hydrodissect the space and confirm proper placement in both axial and sagittal views. The syringe was aspirated to confirm the needle was extravascular. The syringe was replaced with the hydrogel injection apparatus and the gel was injected over about 10 seconds. The needle was then removed. There was minimal blood loss. The patient tolerated procedure well.\par \par Patient was transferred to the recovery area on a monitor. Vital signs were stable, denied pain. Voided post procedure. Post procedure instructions including completion of his course of antibiotics were given. CT/SIM and MRI appointments given. He was discharged home in a stable condition, accompanied by his wife.\par \par \par Impression/Plan\par Mr. Tummino  tolerated the procedure well. He will return for simulation and start radiation therapy shortly afterwards. He was encouraged to contact me with any questions or concerns. \par \par

## 2019-12-05 NOTE — H&P PST ADULT - NSANTHSNORERD_ENT_A_CORE
Refill of:  Levothyroxine 100 mcg  Last order:  6/20/19 for 90 pills x 1   Last appt:  7/1/19 (Upcoming 1/6/20)  [facility]  
Yes

## 2019-12-10 ENCOUNTER — APPOINTMENT (OUTPATIENT)
Dept: MRI IMAGING | Facility: IMAGING CENTER | Age: 74
End: 2019-12-10

## 2019-12-10 ENCOUNTER — OUTPATIENT (OUTPATIENT)
Dept: OUTPATIENT SERVICES | Facility: HOSPITAL | Age: 74
LOS: 1 days | End: 2019-12-10
Payer: COMMERCIAL

## 2019-12-10 DIAGNOSIS — Z87.09 PERSONAL HISTORY OF OTHER DISEASES OF THE RESPIRATORY SYSTEM: Chronic | ICD-10-CM

## 2019-12-10 DIAGNOSIS — Z90.49 ACQUIRED ABSENCE OF OTHER SPECIFIED PARTS OF DIGESTIVE TRACT: Chronic | ICD-10-CM

## 2019-12-10 DIAGNOSIS — C61 MALIGNANT NEOPLASM OF PROSTATE: ICD-10-CM

## 2019-12-10 DIAGNOSIS — Z98.890 OTHER SPECIFIED POSTPROCEDURAL STATES: Chronic | ICD-10-CM

## 2019-12-10 PROBLEM — R97.20 ELEVATED PROSTATE SPECIFIC ANTIGEN [PSA]: Chronic | Status: ACTIVE | Noted: 2019-11-26

## 2019-12-10 PROBLEM — I10 ESSENTIAL (PRIMARY) HYPERTENSION: Chronic | Status: ACTIVE | Noted: 2019-11-26

## 2019-12-10 PROCEDURE — 77332 RADIATION TREATMENT AID(S): CPT | Mod: 26

## 2019-12-10 PROCEDURE — 77290 THER RAD SIMULAJ FIELD CPLX: CPT | Mod: 26

## 2019-12-10 PROCEDURE — 76498 UNLISTED MR PROCEDURE: CPT

## 2019-12-13 PROCEDURE — 77300 RADIATION THERAPY DOSE PLAN: CPT | Mod: 26

## 2019-12-13 PROCEDURE — 77295 3-D RADIOTHERAPY PLAN: CPT | Mod: 26

## 2019-12-13 PROCEDURE — 77334 RADIATION TREATMENT AID(S): CPT | Mod: 26

## 2019-12-23 RX ORDER — CIPROFLOXACIN HYDROCHLORIDE 500 MG/1
500 TABLET, FILM COATED ORAL TWICE DAILY
Qty: 10 | Refills: 0 | Status: DISCONTINUED | COMMUNITY
Start: 2019-10-29 | End: 2019-12-23

## 2019-12-23 RX ORDER — AMLODIPINE BESYLATE 5 MG/1
TABLET ORAL DAILY
Refills: 0 | Status: ACTIVE | COMMUNITY
Start: 2019-12-23

## 2019-12-23 NOTE — REVIEW OF SYSTEMS
[Constipation: Grade 1 - Occasional or intermittent symptoms; occasional use of stool softeners, laxatives, dietary modification, or enema] : Constipation: Grade 1 - Occasional or intermittent symptoms; occasional use of stool softeners, laxatives, dietary modification, or enema [Rectal Pain: Grade 0] : Rectal Pain: Grade 0 [Fatigue: Grade 0] : Fatigue: Grade 0 [Hematuria: Grade 0] : Hematuria: Grade 0 [Urinary Tract Pain: Grade 0] : Urinary Tract Pain: Grade 0 [Dermatitis Radiation: Grade 0] : Dermatitis Radiation: Grade 0

## 2019-12-23 NOTE — PHYSICAL EXAM
[General Appearance - Alert] : alert [General Appearance - In No Acute Distress] : in no acute distress [Skin Color & Pigmentation] : normal skin color and pigmentation [Oriented To Time, Place, And Person] : oriented to person, place, and time

## 2019-12-23 NOTE — REASON FOR VISIT
[Prostate Cancer] : prostate cancer [Routine On-Treatment] : a routine on-treatment visit for [Spouse] : spouse

## 2019-12-30 NOTE — PHYSICAL EXAM
[General Appearance - Alert] : alert [General Appearance - In No Acute Distress] : in no acute distress [Oriented To Time, Place, And Person] : oriented to person, place, and time [Skin Color & Pigmentation] : normal skin color and pigmentation

## 2019-12-31 NOTE — HISTORY OF PRESENT ILLNESS
[FreeTextEntry1] : Mr. Aceves is a 74 year old man with a diagnosis of a [id number], [place] 3+4=7, pre-treatment PSA of 6.44 ng/mL, NCCN favorable intermediate risk prostate cancer.\par \par 12/30/19- 4/5 fx\par Continues to note slight weakness of urinary stream, nocturia 4 x night as well as constipation for which he takes colace. Has not taken metamucil as advised. Using eucerin to treated area\par \par 12/23/19- 2/5 fx\par Notes slight hesitancy and weakness of urinary stream as well as constipation since placement of Space OAR.

## 2019-12-31 NOTE — DISEASE MANAGEMENT
[Clinical] : TNM Stage: c [IIB] : IIB [1] : T1 [c] : c [0] : M0 [7(3+4)] : Scottie Score 7(3+4) [NTNM] : 0 [MTNM] : 0 [TTNM] : 1c [BiopsyDate] : 09/19 [de-identified] : Pelvis [MeasuredProstateVolume] : 27 [MaxCoreInvolvement] : 80 [TotalPositiveCores] : 2 [TotalCores] : 12

## 2020-01-02 PROCEDURE — 77435 SBRT MANAGEMENT: CPT

## 2020-01-03 NOTE — HISTORY OF PRESENT ILLNESS
[FreeTextEntry1] : Mr. Aceves is a 74 year old man with a diagnosis of a [id number], [place] 3+4=7, pre-treatment PSA of 6.44 ng/mL, NCCN favorable intermediate risk prostate cancer.\par \par 12/23/19- 2/5 fx\par Notes slight hesitancy and weakness of urinary stream as well as constipation since placement of Space OAR.

## 2020-01-03 NOTE — DISEASE MANAGEMENT
[Clinical] : TNM Stage: c [IIB] : IIB [1] : T1 [c] : c [0] : M0 [7(3+4)] : Scottie Score 7(3+4) [NTNM] : 0 [TTNM] : 1c [MTNM] : 0 [de-identified] : Pelvis [MeasuredProstateVolume] : 27 [BiopsyDate] : 09/19 [TotalCores] : 12 [TotalPositiveCores] : 2 [MaxCoreInvolvement] : 80

## 2020-03-09 ENCOUNTER — APPOINTMENT (OUTPATIENT)
Dept: RADIATION ONCOLOGY | Facility: CLINIC | Age: 75
End: 2020-03-09
Payer: COMMERCIAL

## 2020-03-09 VITALS
WEIGHT: 184.52 LBS | DIASTOLIC BLOOD PRESSURE: 83 MMHG | TEMPERATURE: 97.8 F | HEART RATE: 81 BPM | OXYGEN SATURATION: 97 % | SYSTOLIC BLOOD PRESSURE: 150 MMHG | HEIGHT: 64 IN | RESPIRATION RATE: 16 BRPM | BODY MASS INDEX: 31.5 KG/M2

## 2020-03-09 PROCEDURE — 99024 POSTOP FOLLOW-UP VISIT: CPT

## 2020-03-09 NOTE — REVIEW OF SYSTEMS
[Patient Intake Form Reviewed] : Patient intake form was reviewed [IPSS Score (0-40): ___] : IPSS score: [unfilled] [EPIC-CP Score (0-60): ___] : EPIC-CP score: [unfilled] [Negative] : Heme/Lymph [Urinary Tract Pain: Grade 0] : Urinary Tract Pain: Grade 0 [Urinary Frequency: Grade 1 - Present] : Urinary Frequency: Grade 1 - Present [Rectal Pain: Grade 0] : Rectal Pain: Grade 0 [Ejaculation Disorder: Grade 0] : Ejaculation Disorder: Grade 0 [Erectile Dysfunction: Grade 0] : Erectile Dysfunction: Grade 0 [Urinary Urgency: Grade 1 - Present] : Urinary Urgency: Grade 1 - Present [FreeTextEntry1] : Penicillin, sulfa

## 2020-03-09 NOTE — PHYSICAL EXAM
[Sclera] : the sclera and conjunctiva were normal [Outer Ear] : the ears and nose were normal in appearance [Motor Tone] : muscle strength and tone were normal [No Focal Deficits] : no focal deficits [Normal] : oriented to person, place and time, the affect was normal, the mood was normal and not anxious [General Appearance - Alert] : alert [General Appearance - In No Acute Distress] : in no acute distress [] : no respiratory distress [Abdomen Soft] : soft [Skin Color & Pigmentation] : normal skin color and pigmentation [Oriented To Time, Place, And Person] : oriented to person, place, and time [Abdomen Tenderness] : non-tender [No Rectal Mass] : no rectal mass [No Prostate Nodules] : no prostate nodules [Prostate Not Enlarge] : prostate not enlarged

## 2020-03-09 NOTE — HISTORY OF PRESENT ILLNESS
[FreeTextEntry1] : Mr. Aceves is a 74 year old man with a diagnosis of a [id number], [place] 3+4=7, pre-treatment PSA of 6.44 ng/mL, NCCN favorable intermediate risk prostate cancer. He received 4250cgy/5fx from 12/20/19-1/2/20.\par \par He initially presented to Dr. Muñoz on 3/14/19 with a PSA of 4.94 ng/mL (measured 2/21/19) entirely asymptomatic.  His previous PSA was 3.72 ng/mL.  Plan was for repeat PSA.  His IPSS was 3 at this visit. Repeat PSA on 7/31/19 was 6.44 ng/mL. He saw Dr. Muñoz on 8/8/19 and then underwent 12 core biopsy on 9/16/19.  In the right apex biopsy, 2 of 2 cores were positive for [place] 3+4=7 disease involving 80% and 50% of the cores.  The other core biopsies from the right mid, right base, left apex, left mid, and left base were negative.  His prostate volume was 27 cc.\par \par \par He presents for a PTE.\par Today he notes that his urinary symptoms are back to baseline. He has some nocturia 2 times at night but otherwise denies other urinary symptoms.  He is able to have achieve/maintain erections. Denies abdominal pain, diarrhea, blood in urine or stool. IPSS/EPIC 2/1. He does not have a follow up with Dr. Muñoz yet.  He sees his PMD regularly and will be getting blood work including PSA later this week.\par \par

## 2020-03-12 ENCOUNTER — APPOINTMENT (OUTPATIENT)
Dept: UROLOGY | Facility: CLINIC | Age: 75
End: 2020-03-12
Payer: COMMERCIAL

## 2020-03-12 PROCEDURE — 99213 OFFICE O/P EST LOW 20 MIN: CPT

## 2020-03-13 NOTE — LETTER BODY
[FreeTextEntry1] : Jerome White MD. \par [address]\[place] [address]\par [phone]\par \par Dear Dr. White,\par \par Reason for Visit: Elevated PSA. Prostate Cancer.\par \par This is a 74 -year-old retired gentleman with elevated PSA. Patient obtained prostate biopsy 6 months ago, positive for [place] 7 adenocarcinoma of the prostate involving 2 of 12 cores. Patient returns for follow-up. Since his last visit, patient denies any interval complaints or difficulties. He reports completing radiation therapy. His PSA is now 0.81. The patient is entirely asymptomatic. All other review of systems are negative. He has no cancer in his family medical history. He has previous surgical history. Past medical history, family history and social history were unchanged. Medications and allergies were reviewed. Patient is allergic to Penicillin and Sulfa. \par \par On examination, the patient is a healthy-appearing gentleman in no acute distress. He is alert and oriented follows commands. He has normal mood and affect. He is normocephalic. Neck is supple. Oral no thrush. Respirations are unlabored. His abdomen is soft and nontender. Bladder is nonpalpable. No CVA tenderness. Neurologically he is grossly intact. No peripheral edema. Skin without gross abnormality. He has normal male external genitalia. Normal meatus. Bilateral testes are descended intrascrotally and normal to palpation. On rectal examination, there is normal sphincter tone. The prostate is clinically benign without focal induration or nodularity.\par \par His PSA is 0.81, which has decreased. His previous PSA was 6.44\par \par Assessment: Elevated PSA. Prostate Cancer.\par \par I counseled the patient. Patient's PSA is now improved, 0.81, after completing radiation therapy. I reassured him. I recommend he follow up in 1 year to ensure stability. Patient understands that if he develops gross hematuria or any urinary discomfort, he will contact me for further evaluation.  The risks and benefits were discussed. Alternatives were given. I answered the patient questions. The patient will follow-up as directed and will contact me with any questions or concerns.Thank you for the opportunity to participate in the care of this patient. I'll keep you updated on his progress.\par \par Plan: Follow up in 1 year.

## 2020-06-19 ENCOUNTER — APPOINTMENT (OUTPATIENT)
Dept: RADIATION ONCOLOGY | Facility: CLINIC | Age: 75
End: 2020-06-19
Payer: COMMERCIAL

## 2020-06-19 VITALS
RESPIRATION RATE: 16 BRPM | OXYGEN SATURATION: 97 % | SYSTOLIC BLOOD PRESSURE: 155 MMHG | TEMPERATURE: 36.3 F | HEART RATE: 76 BPM | DIASTOLIC BLOOD PRESSURE: 82 MMHG

## 2020-06-19 PROCEDURE — 99213 OFFICE O/P EST LOW 20 MIN: CPT | Mod: GC

## 2020-06-19 NOTE — DISEASE MANAGEMENT
[1] : T1 [c] : c [0] : M0 [0-10] : 0 -10 ng/mL [7(3+4)] : Scottie Score 7(3+4) [IIB] : IIB [EBRT] : EBRT [Radiation Therapy] : Radiation Therapy [BiopsyDate] : 8/8/19 [TotalCores] : 12 [TotalPositiveCores] : 2 [MaxCoreInvolvement] : 80% [RadiationCompletedDate] : 1/2/20 [EBRTDose] : 0120 [EBRTFractions] : 5

## 2020-06-19 NOTE — HISTORY OF PRESENT ILLNESS
[FreeTextEntry1] : Mr. Aceves is a 74 year old man with a diagnosis of a [id number], [place] 3+4=7, pre-treatment PSA of 6.44 ng/mL, NCCN favorable intermediate risk prostate cancer. He underwent SBRT between 12/20/19 - 1/2/20 to a dose of 4240 cGy. He tolerated radiation treatment well without developing any grade 3 or higher acute toxicity as a result of his treatment.\par \par PSA:\par 3/11/20   -  0.81\par 6/10/20   -  0.46\par \par Patient presents today for follow up. He has mildly bothersome urinary frequency with nocturia 2-3x nightly. No problems with incontinence, erectile function, or bowel movements.

## 2020-06-19 NOTE — REVIEW OF SYSTEMS
[Negative] : Endocrine [IPSS Score (0-40): ___] : IPSS score: [unfilled] [EPIC-CP Score (0-60): ___] : EPIC-CP score: [unfilled]

## 2020-12-14 ENCOUNTER — APPOINTMENT (OUTPATIENT)
Dept: UROLOGY | Facility: CLINIC | Age: 75
End: 2020-12-14
Payer: MEDICARE

## 2020-12-14 VITALS
HEIGHT: 64 IN | HEART RATE: 84 BPM | WEIGHT: 182 LBS | SYSTOLIC BLOOD PRESSURE: 161 MMHG | DIASTOLIC BLOOD PRESSURE: 81 MMHG | TEMPERATURE: 97 F | BODY MASS INDEX: 31.07 KG/M2

## 2020-12-14 PROCEDURE — 99213 OFFICE O/P EST LOW 20 MIN: CPT

## 2020-12-14 RX ORDER — TAMSULOSIN HYDROCHLORIDE 0.4 MG/1
0.4 CAPSULE ORAL
Qty: 90 | Refills: 3 | Status: COMPLETED | COMMUNITY
Start: 2020-06-19 | End: 2020-12-14

## 2020-12-14 NOTE — LETTER BODY
[FreeTextEntry1] : Jerome White MD. \par [address]\[place] [address]\par [phone]\par \par Dear Dr. White,\par \par Reason for Visit: Elevated PSA. Prostate cancer s/p radiation therapy.\par \par This is a 75 year-old retired gentleman with elevated PSA and prostate cancer status post radiation therapy. His prostate biopsy in September 2019 was positive for [place] 7 adenocarcinoma of the prostate involving 2 of 12 cores. He returns today for follow-up. Since he was last seen, the patient denies any interval complaints or difficulties. The patient reports that he is overall well. He denies any pain. He denies any changes in health. The patient is entirely asymptomatic. All other review of systems are negative. Past medical history, family history and social history were unchanged. Medications and allergies were reviewed. Patient is allergic to Penicillin and Sulfa. \par \par On examination, the patient is a healthy-appearing gentleman in no acute distress. He is alert and oriented follows commands. He has normal mood and affect. He is normocephalic. Neck is supple. Oral no thrush. Respirations are unlabored. His abdomen is soft and nontender. Bladder is nonpalpable. No CVA tenderness. Neurologically he is grossly intact. No peripheral edema. Skin without gross abnormality. He has normal male external genitalia. Normal meatus. Bilateral testes are descended intrascrotally and normal to palpation. On rectal examination, there is normal sphincter tone. The prostate is clinically benign without focal induration or nodularity.\par \par His last BMP in June demonstrated normal renal functions, creatinine 1.13. His PSA in June was 0.46, which is improved. His previous PSA was 0.81.\par \par \par Assessment: Elevated PSA. Prostate cancer s/p radiation therapy.\par \par I counseled the patient. He is doing well. In terms of his prostate cancer, his PSA has improved to 0.46. I recommended the patient repeat PSA to ensure stability. He will follow-up in 1 year to ensure stability. Risks and alternatives were discussed. I answered the patient questions. The patient will follow-up as directed and will contact me with any questions or concerns. Thank you for the opportunity to participate in the care of Mr. LINDSAY. I will keep you updated on his progress.\par \par Plan: Repeat PSA. Follow-up in 1 year.

## 2020-12-14 NOTE — ADDENDUM
[FreeTextEntry1] : Entered by Chris Pisano, acting as scribe for Dr. Derrick Muñoz.\par \par The documentation recorded by the scribe accurately reflects the service I personally performed and the decisions made by me.\par

## 2020-12-17 ENCOUNTER — APPOINTMENT (OUTPATIENT)
Dept: UROLOGY | Facility: CLINIC | Age: 75
End: 2020-12-17

## 2020-12-31 ENCOUNTER — APPOINTMENT (OUTPATIENT)
Dept: RADIATION ONCOLOGY | Facility: CLINIC | Age: 75
End: 2020-12-31
Payer: MEDICARE

## 2020-12-31 PROCEDURE — 99213 OFFICE O/P EST LOW 20 MIN: CPT | Mod: 95

## 2020-12-31 NOTE — HISTORY OF PRESENT ILLNESS
[Home] : at home, [unfilled] , at the time of the visit. [Medical Office: ([facility])___] : at the medical office located in  [Verbal consent obtained from patient] : the patient, [unfilled] [FreeTextEntry1] : Mr. Aceves is a 75 year old man with a diagnosis of a [id number], [place] 3+4=7, pre-treatment PSA of 6.44 ng/mL, NCCN favorable intermediate risk prostate cancer. He underwent SBRT between 12/20/19 - 1/2/20 to a dose of 4250 cGy. He tolerated radiation treatment well without developing any grade 3 or higher acute toxicity as a result of his treatment.\par \par PSA:\par 3/11/20   -  0.81\par 6/10/20   -  0.46\par 12/18/20 -  0.40\par \par Patient presents today for follow up. He report nocturia 1-2 times per night and occasional burning sensation at the end of urination. Went to his PCP for burning sensation with urination two weeks ago and was prescribed Pyridium which help. Has regular daily bowel function and he is sexually active.

## 2020-12-31 NOTE — REVIEW OF SYSTEMS
[Negative] : Endocrine [Nocturia] : nocturia [IPSS Score (0-40): ___] : IPSS score: [unfilled] [EPIC-CP Score (0-60): ___] : EPIC-CP score: [unfilled] [Hematuria: Grade 0] : Hematuria: Grade 0 [Urinary Incontinence: Grade 0] : Urinary Incontinence: Grade 0  [Urinary Retention: Grade 0] : Urinary Retention: Grade 0 [Urinary Tract Pain: Grade 0] : Urinary Tract Pain: Grade 0 [Urinary Urgency: Grade 0] : Urinary Urgency: Grade 0 [Urinary Frequency: Grade 0] : Urinary Frequency: Grade 0 [Ejaculation Disorder: Grade 0] : Ejaculation Disorder: Grade 0 [Erectile Dysfunction: Grade 0] : Erectile Dysfunction: Grade 0 [Urinary Frequency] : no urinary frequency

## 2020-12-31 NOTE — DISEASE MANAGEMENT
[1] : T1 [c] : c [0] : M0 [0-10] : 0 -10 ng/mL [7(3+4)] : Scottie Score 7(3+4) [IIB] : IIB [Radiation Therapy] : Radiation Therapy [EBRT] : EBRT [BiopsyDate] : 8/8/19 [TotalCores] : 12 [TotalPositiveCores] : 2 [MaxCoreInvolvement] : 80% [RadiationCompletedDate] : 1/2/20 [EBRTDose] : 1326 [EBRTFractions] : 5

## 2021-06-24 ENCOUNTER — APPOINTMENT (OUTPATIENT)
Dept: RADIATION ONCOLOGY | Facility: CLINIC | Age: 76
End: 2021-06-24
Payer: MEDICARE

## 2021-06-24 PROCEDURE — 99213 OFFICE O/P EST LOW 20 MIN: CPT

## 2021-06-24 NOTE — DISEASE MANAGEMENT
[1] : T1 [c] : c [0] : M0 [0-10] : 0 -10 ng/mL [7(3+4)] : [place]ie Score 7(3+4) [IIB] : IIB [Radiation Therapy] : Radiation Therapy [EBRT] : EBRT [BiopsyDate] : 8/8/19 [TotalCores] : 12 [TotalPositiveCores] : 2 [MaxCoreInvolvement] : 80% [RadiationCompletedDate] : 1/2/20 [EBRTDose] : 0261 [EBRTFractions] : 5

## 2021-06-24 NOTE — REVIEW OF SYSTEMS
[Negative] : Endocrine [Hematuria: Grade 0] : Hematuria: Grade 0 [Urinary Incontinence: Grade 0] : Urinary Incontinence: Grade 0  [Urinary Retention: Grade 0] : Urinary Retention: Grade 0 [Urinary Tract Pain: Grade 0] : Urinary Tract Pain: Grade 0 [Urinary Urgency: Grade 0] : Urinary Urgency: Grade 0 [Urinary Frequency: Grade 0] : Urinary Frequency: Grade 0 [Ejaculation Disorder: Grade 0] : Ejaculation Disorder: Grade 0 [Erectile Dysfunction: Grade 0] : Erectile Dysfunction: Grade 0 [Nocturia] : nocturia [IPSS Score (0-40): ___] : IPSS score: [unfilled] [EPIC-CP Score (0-60): ___] : EPIC-CP score: [unfilled] [Urinary Frequency] : no urinary frequency

## 2021-06-24 NOTE — HISTORY OF PRESENT ILLNESS
[FreeTextEntry1] : Mr. Aceves is a 75 year old man with a diagnosis of a [id number], [place] 3+4=7, pre-treatment PSA of 6.44 ng/mL, NCCN favorable intermediate risk prostate cancer. He underwent SBRT between 12/20/19 - 1/2/20 to a dose of 4250 cGy. He tolerated radiation treatment well without developing any grade 3 or higher acute toxicity as a result of his treatment.\par \par PSA:\par 3/11/20   -  0.81\par 6/10/20   -  0.46\par 12/18/20 -  0.40\par 6/19/21   -  0.201\par \par Patient presents today for follow up. He report nocturia twice per night and denies any other urinary or bowel issues. He is sexually active.

## 2021-06-29 VITALS
BODY MASS INDEX: 31.52 KG/M2 | RESPIRATION RATE: 17 BRPM | HEART RATE: 75 BPM | DIASTOLIC BLOOD PRESSURE: 87 MMHG | OXYGEN SATURATION: 96 % | TEMPERATURE: 97 F | WEIGHT: 183.64 LBS | SYSTOLIC BLOOD PRESSURE: 151 MMHG

## 2021-12-07 ENCOUNTER — NON-APPOINTMENT (OUTPATIENT)
Age: 76
End: 2021-12-07

## 2021-12-16 ENCOUNTER — APPOINTMENT (OUTPATIENT)
Dept: UROLOGY | Facility: CLINIC | Age: 76
End: 2021-12-16
Payer: MEDICARE

## 2021-12-16 DIAGNOSIS — R97.20 ELEVATED PROSTATE, SPECIFIC ANTIGEN [PSA]: ICD-10-CM

## 2021-12-16 DIAGNOSIS — J98.19 OTHER PULMONARY COLLAPSE: ICD-10-CM

## 2021-12-16 PROCEDURE — 99213 OFFICE O/P EST LOW 20 MIN: CPT

## 2021-12-16 NOTE — ADDENDUM
[FreeTextEntry1] : Entered by Michael Bolton, acting as scribe for Dr. Derrick Muñoz.\par \par The documentation recorded by the scribe accurately reflects the service I personally performed and the decisions made by me.

## 2021-12-16 NOTE — HISTORY OF PRESENT ILLNESS
[FreeTextEntry1] : Please refer to URO Consult note \par \par \par FUA prostate cancer \par PSA 0.1\par come back in 1 yr

## 2021-12-19 LAB
APPEARANCE: CLEAR
BACTERIA UR CULT: NORMAL
BACTERIA: NEGATIVE
BILIRUBIN URINE: NEGATIVE
BLOOD URINE: NEGATIVE
COLOR: YELLOW
GLUCOSE QUALITATIVE U: NEGATIVE
HYALINE CASTS: 0 /LPF
KETONES URINE: NEGATIVE
LEUKOCYTE ESTERASE URINE: NEGATIVE
MICROSCOPIC-UA: NORMAL
NITRITE URINE: NEGATIVE
PH URINE: 6
PROTEIN URINE: NORMAL
RED BLOOD CELLS URINE: 1 /HPF
SPECIFIC GRAVITY URINE: 1.03
SQUAMOUS EPITHELIAL CELLS: 1 /HPF
UROBILINOGEN URINE: NORMAL
WHITE BLOOD CELLS URINE: 0 /HPF

## 2021-12-27 NOTE — REVIEW OF SYSTEMS
[IPSS Score (0-40): ___] : IPSS score: [unfilled] [EPIC-CP Score (0-60): ___] : EPIC-CP score: [unfilled] [Negative] : Endocrine [Hematuria: Grade 0] : Hematuria: Grade 0 [Urinary Incontinence: Grade 0] : Urinary Incontinence: Grade 0  [Urinary Retention: Grade 0] : Urinary Retention: Grade 0 [Urinary Tract Pain: Grade 0] : Urinary Tract Pain: Grade 0 [Urinary Urgency: Grade 0] : Urinary Urgency: Grade 0 [Urinary Frequency: Grade 0] : Urinary Frequency: Grade 0 [Ejaculation Disorder: Grade 0] : Ejaculation Disorder: Grade 0 [Erectile Dysfunction: Grade 0] : Erectile Dysfunction: Grade 0

## 2021-12-30 ENCOUNTER — APPOINTMENT (OUTPATIENT)
Dept: RADIATION ONCOLOGY | Facility: CLINIC | Age: 76
End: 2021-12-30
Payer: MEDICARE

## 2021-12-30 VITALS
BODY MASS INDEX: 31.75 KG/M2 | DIASTOLIC BLOOD PRESSURE: 95 MMHG | WEIGHT: 184.95 LBS | TEMPERATURE: 97.34 F | HEART RATE: 81 BPM | RESPIRATION RATE: 17 BRPM | OXYGEN SATURATION: 95 % | SYSTOLIC BLOOD PRESSURE: 157 MMHG

## 2021-12-30 PROCEDURE — 99212 OFFICE O/P EST SF 10 MIN: CPT | Mod: GC

## 2021-12-30 NOTE — PHYSICAL EXAM
[Normal] : oriented to person, place and time, the affect was normal, the mood was normal and not anxious [] : no respiratory distress [Heart Rate And Rhythm] : heart rate and rhythm were normal

## 2021-12-30 NOTE — DISEASE MANAGEMENT
[1] : T1 [c] : c [0] : M0 [0-10] : 0 -10 ng/mL [IIB] : IIB [Radiation Therapy] : Radiation Therapy [EBRT] : EBRT [TotalCores] : 12 [TotalPositiveCores] : 2 [MaxCoreInvolvement] : 80% [RadiationCompletedDate] : 1/2/20 [EBRTDose] : 7924 [EBRTFractions] : 5

## 2021-12-30 NOTE — HISTORY OF PRESENT ILLNESS
[FreeTextEntry1] : Mr. Acvees is a 75 year old man with a diagnosis of a [id number], [place] 3+4=7, pre-treatment PSA of 6.44 ng/mL, NCCN favorable intermediate risk prostate cancer. He underwent SBRT between 12/20/19 - 1/2/20 to a dose of 4250 cGy. He tolerated radiation treatment well without developing any grade 3 or higher acute toxicity as a result of his treatment.\par \par PSA:\par 3/11/20   -  0.81\par 6/10/20   -  0.46\par 12/18/20 -  0.40\par 6/19/21   -  0.201\par 12/14/21 -  0.104\par \par Patient presents today for follow up. Patient notes continues with nocturia 1-2 daily but notes significant improvement since 1 year ago. He feels things are well and has no complaints. Denies urgency, heamaturia. No bowel complaints.

## 2022-07-07 ENCOUNTER — APPOINTMENT (OUTPATIENT)
Dept: RADIATION ONCOLOGY | Facility: CLINIC | Age: 77
End: 2022-07-07

## 2022-07-14 ENCOUNTER — APPOINTMENT (OUTPATIENT)
Dept: RADIATION ONCOLOGY | Facility: CLINIC | Age: 77
End: 2022-07-14

## 2022-07-14 VITALS
RESPIRATION RATE: 17 BRPM | OXYGEN SATURATION: 96 % | SYSTOLIC BLOOD PRESSURE: 155 MMHG | WEIGHT: 184.74 LBS | BODY MASS INDEX: 31.71 KG/M2 | DIASTOLIC BLOOD PRESSURE: 87 MMHG | HEART RATE: 70 BPM

## 2022-07-14 PROCEDURE — 99213 OFFICE O/P EST LOW 20 MIN: CPT

## 2022-07-14 NOTE — REVIEW OF SYSTEMS
[Nocturia] : nocturia [IPSS Score (0-40): ___] : IPSS score: [unfilled] [EPIC-CP Score (0-60): ___] : EPIC-CP score: [unfilled] [Negative] : Endocrine [Hematuria: Grade 0] : Hematuria: Grade 0 [Urinary Incontinence: Grade 1 - Occasional (e.g., with coughing, sneezing, etc.), pads not indicated] : Urinary Incontinence: Grade 1 - Occasional (e.g., with coughing, sneezing, etc.), pads not indicated [Urinary Retention: Grade 0] : Urinary Retention: Grade 0 [Urinary Tract Pain: Grade 0] : Urinary Tract Pain: Grade 0 [Urinary Urgency: Grade 0] : Urinary Urgency: Grade 0 [Urinary Frequency: Grade 0] : Urinary Frequency: Grade 0 [Ejaculation Disorder: Grade 0] : Ejaculation Disorder: Grade 0 [Erectile Dysfunction: Grade 0] : Erectile Dysfunction: Grade 0 [Urinary Frequency] : no urinary frequency

## 2022-07-14 NOTE — HISTORY OF PRESENT ILLNESS
[FreeTextEntry1] : Mr. Aceves is a 75 year old man with a diagnosis of a [id number], [place] 3+4=7, pre-treatment PSA of 6.44 ng/mL, NCCN favorable intermediate risk prostate cancer. He underwent SBRT between 12/20/19 - 1/2/20 to a dose of 4250 cGy. He tolerated radiation treatment well without developing any grade 3 or higher acute toxicity as a result of his treatment.\par \par PSA:\par 3/11/20   -  0.81\par 6/10/20   -  0.46\par 12/18/20 -  0.40\par 6/19/21   -  0.201\par 12/14/21 -  0.104\par \par Patient presents today for follow up. Patient notes continues with nocturia 1-2 daily  and occasional dribbling. denies any other urinary or bowel issues and he is sexually active

## 2022-07-14 NOTE — DISEASE MANAGEMENT
[1] : T1 [c] : c [0] : M0 [0-10] : 0 -10 ng/mL [IIB] : IIB [Radiation Therapy] : Radiation Therapy [EBRT] : EBRT [TotalCores] : 12 [TotalPositiveCores] : 2 [MaxCoreInvolvement] : 80% [RadiationCompletedDate] : 1/2/20 [EBRTDose] : 3521 [EBRTFractions] : 5

## 2022-07-14 NOTE — PHYSICAL EXAM
[] : no respiratory distress [Heart Rate And Rhythm] : heart rate and rhythm were normal [Normal] : oriented to person, place and time, the affect was normal, the mood was normal and not anxious

## 2022-12-12 ENCOUNTER — APPOINTMENT (OUTPATIENT)
Dept: UROLOGY | Facility: CLINIC | Age: 77
End: 2022-12-12

## 2022-12-12 PROCEDURE — 99213 OFFICE O/P EST LOW 20 MIN: CPT

## 2022-12-12 NOTE — ADDENDUM
[FreeTextEntry1] : Entered by Angie Montaño, acting as scribe for Dr. Derrick Muñoz.\par The documentation recorded by the scribe accurately reflects the service I personally performed and the decisions made by me.

## 2022-12-12 NOTE — LETTER BODY
[FreeTextEntry1] : Jerome White MD. \par [address]\[place] [address]\par [phone]\par \par Dear Dr. White,\par \par Reason for Visit: Elevated PSA. Prostate cancer s/p radiation therapy.\par \par This is a 77 year-old retired gentleman with elevated PSA and prostate cancer status post radiation therapy. His prostate biopsy in September 2019 was positive for [place] 7 adenocarcinoma of the prostate involving 2 of 12 cores. He returns today for follow-up. Since he was last seen, the patient denies any interval complaints or difficulties. The patient reports that he is overall well. He denies any pain. He denies any changes in health. The patient is entirely asymptomatic. All other review of systems are negative. Past medical history, family history and social history were unchanged. Medications and allergies were reviewed. Patient is allergic to Penicillin and Sulfa. \par \par On examination, the patient is a healthy-appearing gentleman in no acute distress. He is alert and oriented follows commands. He has normal mood and affect. He is normocephalic. Neck is supple. Oral no thrush. Respirations are unlabored. His abdomen is soft and nontender. Bladder is nonpalpable. No CVA tenderness. Neurologically he is grossly intact. No peripheral edema. Skin without gross abnormality. He has normal male external genitalia. Normal meatus. Bilateral testes are descended intrascrotally and normal to palpation. On rectal examination, there is normal sphincter tone. The prostate is clinically benign without focal induration or nodularity.\par \par His recent BMP demonstrated normal renal functions, creatinine 1.17. His recent PSA was 0.034, which is within normal limits.\par \par Assessment: Elevated PSA. Prostate cancer s/p radiation therapy.\par \par I counseled the patient. He is doing well. In terms of his prostate cancer, his PSA is stable and within normal limits.  He will follow-up in 1 year for PSA testing to ensure stability. Patient understands that if he develops gross hematuria or any urinary discomfort, he will contact me for further evaluation. Risks and alternatives were discussed. I answered the patient questions. The patient will follow-up as directed and will contact me with any questions or concerns. Thank you for the opportunity to participate in the care of Mr. LINDSAY. I will keep you updated on his progress.\par \par Plan: Follow-up in 1 year.

## 2023-01-17 ENCOUNTER — APPOINTMENT (OUTPATIENT)
Dept: RADIATION ONCOLOGY | Facility: CLINIC | Age: 78
End: 2023-01-17
Payer: MEDICARE

## 2023-01-17 VITALS
DIASTOLIC BLOOD PRESSURE: 83 MMHG | TEMPERATURE: 97.16 F | OXYGEN SATURATION: 95 % | BODY MASS INDEX: 31.24 KG/M2 | WEIGHT: 183 LBS | RESPIRATION RATE: 17 BRPM | HEIGHT: 64 IN | HEART RATE: 78 BPM | SYSTOLIC BLOOD PRESSURE: 148 MMHG

## 2023-01-17 PROCEDURE — 99213 OFFICE O/P EST LOW 20 MIN: CPT

## 2023-01-17 NOTE — DISEASE MANAGEMENT
[1] : T1 [c] : c [0] : M0 [0-10] : 0 -10 ng/mL [IIB] : IIB [Radiation Therapy] : Radiation Therapy [EBRT] : EBRT [TotalCores] : 12 [TotalPositiveCores] : 2 [MaxCoreInvolvement] : 80% [RadiationCompletedDate] : 1/2/20 [EBRTDose] : 3506 [EBRTFractions] : 5

## 2023-01-17 NOTE — REVIEW OF SYSTEMS
[Negative] : Endocrine [Hematuria: Grade 0] : Hematuria: Grade 0 [Urinary Incontinence: Grade 1 - Occasional (e.g., with coughing, sneezing, etc.), pads not indicated] : Urinary Incontinence: Grade 1 - Occasional (e.g., with coughing, sneezing, etc.), pads not indicated [Urinary Retention: Grade 0] : Urinary Retention: Grade 0 [Urinary Tract Pain: Grade 0] : Urinary Tract Pain: Grade 0 [Urinary Urgency: Grade 0] : Urinary Urgency: Grade 0 [Urinary Frequency: Grade 0] : Urinary Frequency: Grade 0 [Ejaculation Disorder: Grade 0] : Ejaculation Disorder: Grade 0 [Erectile Dysfunction: Grade 0] : Erectile Dysfunction: Grade 0 [IPSS Score (0-40): ___] : IPSS score: [unfilled] [EPIC-CP Score (0-60): ___] : EPIC-CP score: [unfilled]

## 2023-01-17 NOTE — HISTORY OF PRESENT ILLNESS
[FreeTextEntry1] : Mr. Aceves is a 75 year old man with a diagnosis of a [id number], [place] 3+4=7, pre-treatment PSA of 6.44 ng/mL, NCCN favorable intermediate risk prostate cancer. He underwent SBRT between 12/20/19 - 1/2/20 to a dose of 4250 cGy. He tolerated radiation treatment well without developing any grade 3 or higher acute toxicity as a result of his treatment.\par \par PSA:\par 3/11/20   -  0.81\par 6/10/20   -  0.46\par 12/18/20 -  0.40\par 6/19/21   -  0.201\par 12/14/21 -  0.104\par 12/5/22  -   0.034\par \par Patient presents today for follow up. Patient report nocturia twice per night and denies any other urinary or bowel issues and he is sexually active. Patient is mostly satisfied with his quality of life.

## 2023-01-17 NOTE — ASSESSMENT
[FreeTextEntry1] : Patient refused .Patient examined by Dr. Toni Meza on 12/12/22 and stated every is clear

## 2023-04-12 ENCOUNTER — NON-APPOINTMENT (OUTPATIENT)
Age: 78
End: 2023-04-12

## 2023-07-10 ENCOUNTER — APPOINTMENT (OUTPATIENT)
Dept: RADIATION ONCOLOGY | Facility: CLINIC | Age: 78
End: 2023-07-10
Payer: MEDICARE

## 2023-07-10 VITALS
HEART RATE: 76 BPM | TEMPERATURE: 96.08 F | SYSTOLIC BLOOD PRESSURE: 151 MMHG | RESPIRATION RATE: 17 BRPM | DIASTOLIC BLOOD PRESSURE: 92 MMHG | OXYGEN SATURATION: 97 %

## 2023-07-10 PROCEDURE — 99214 OFFICE O/P EST MOD 30 MIN: CPT

## 2023-07-10 NOTE — REVIEW OF SYSTEMS
[Negative] : Endocrine [Hematuria: Grade 0] : Hematuria: Grade 0 [Urinary Retention: Grade 0] : Urinary Retention: Grade 0 [Urinary Incontinence: Grade 1 - Occasional (e.g., with coughing, sneezing, etc.), pads not indicated] : Urinary Incontinence: Grade 1 - Occasional (e.g., with coughing, sneezing, etc.), pads not indicated [Urinary Tract Pain: Grade 0] : Urinary Tract Pain: Grade 0 [Urinary Urgency: Grade 0] : Urinary Urgency: Grade 0 [Urinary Frequency: Grade 0] : Urinary Frequency: Grade 0 [Ejaculation Disorder: Grade 0] : Ejaculation Disorder: Grade 0 [Erectile Dysfunction: Grade 0] : Erectile Dysfunction: Grade 0 [Nocturia] : nocturia [Urinary Frequency] : no urinary frequency [IPSS Score (0-40): ___] : IPSS score: [unfilled] [EPIC-CP Score (0-60): ___] : EPIC-CP score: [unfilled]

## 2023-07-10 NOTE — HISTORY OF PRESENT ILLNESS
[FreeTextEntry1] : Mr. Aceves is a 75 year old man with a diagnosis of a [id number], Scottie 3+4=7, pre-treatment PSA of 6.44 ng/mL, NCCN favorable intermediate risk prostate cancer. He underwent SBRT between 12/20/19 - 1/2/20 to a dose of 4250 cGy. He tolerated radiation treatment well without developing any grade 3 or higher acute toxicity as a result of his treatment.\par \par PSA:\par 3/11/20   -  0.81\par 6/10/20   -  0.46\par 12/18/20 -  0.40\par 6/19/21   -  0.201\par 12/14/21 -  0.104\par 12/5/22  -   0.034\par 7/4/23    -   0.022\par \par Patient presents today for follow up. Patient report nocturia twice per night and denies any other urinary or bowel issues and he is sexually active. Patient is pleased with his quality of life.

## 2023-07-10 NOTE — DISEASE MANAGEMENT
[1] : T1 [c] : c [0] : M0 [0-10] : 0 -10 ng/mL [IIB] : IIB [Radiation Therapy] : Radiation Therapy [EBRT] : EBRT [TotalCores] : 12 [TotalPositiveCores] : 2 [MaxCoreInvolvement] : 80% [RadiationCompletedDate] : 1/2/20 [EBRTDose] : 6935 [EBRTFractions] : 5

## 2023-10-21 ENCOUNTER — NON-APPOINTMENT (OUTPATIENT)
Age: 78
End: 2023-10-21

## 2023-12-14 ENCOUNTER — APPOINTMENT (OUTPATIENT)
Dept: UROLOGY | Facility: CLINIC | Age: 78
End: 2023-12-14
Payer: MEDICARE

## 2023-12-14 PROCEDURE — 99213 OFFICE O/P EST LOW 20 MIN: CPT

## 2023-12-14 NOTE — HISTORY OF PRESENT ILLNESS
[FreeTextEntry1] : Follow-up prostate cancer.  PSA 0.027.  There is no evidence of recurrent disease.  Patient denies any urinary symptoms.  Denies hematuria.  Reassured patient.  Follow-up 1 year.

## 2024-01-16 ENCOUNTER — APPOINTMENT (OUTPATIENT)
Dept: RADIATION ONCOLOGY | Facility: CLINIC | Age: 79
End: 2024-01-16
Payer: MEDICARE

## 2024-01-16 VITALS
HEIGHT: 64 IN | RESPIRATION RATE: 17 BRPM | DIASTOLIC BLOOD PRESSURE: 81 MMHG | SYSTOLIC BLOOD PRESSURE: 135 MMHG | TEMPERATURE: 95.18 F | HEART RATE: 73 BPM | OXYGEN SATURATION: 95 %

## 2024-01-16 DIAGNOSIS — C61 MALIGNANT NEOPLASM OF PROSTATE: ICD-10-CM

## 2024-01-16 PROCEDURE — 99214 OFFICE O/P EST MOD 30 MIN: CPT

## 2024-01-16 NOTE — DISEASE MANAGEMENT
[1] : T1 [c] : c [0] : M0 [0-10] : 0 -10 ng/mL [IIB] : IIB [Radiation Therapy] : Radiation Therapy [EBRT] : EBRT [TotalCores] : 12 [TotalPositiveCores] : 2 [MaxCoreInvolvement] : 80% [RadiationCompletedDate] : 1/2/20 [EBRTDose] : 4654 [EBRTFractions] : 5

## 2024-01-16 NOTE — HISTORY OF PRESENT ILLNESS
[FreeTextEntry1] : A 75-year-old man with a diagnosis of a [id number], Scottie 3+4=7, pre-treatment PSA of 6.44 ng/mL, NCCN favorable [id number]-old man with a diagnosis of a [id number], Scottie 3+4=7, pre-treatment PSA of 6.44 ng/mL, NCCN favorable intermediate-risk prostate cancer. He underwent SBRT between 12/20/19 - 1/2/20 to a dose of 4250 cGy. He tolerated radiation treatment well without developing any grade 3 or higher acute toxicity as a result of his treatment.  PSA: 3/11/20   -  0.81 6/10/20   -  0.46 12/18/20 -  0.40 6/19/21   -  0.201 12/14/21 -  0.104 12/5/22  -   0.034 7/4/23    -   0.022 10/4/23  -   0.027 1/10/24  -   0.033  The patient presents today for follow-up. Patient report nocturia twice per night, and urgency. He denies any other urinary or bowel issues and he is sexually active.

## 2024-01-23 NOTE — LETTER BODY
Please review mom's MyChart message and advise.   Thank you.   
[FreeTextEntry1] : Jerome White MD. \par [address]\[place] [address]\par [phone]\par \par Dear Dr. White,\par \par Reason for Visit: Elevated PSA. Prostate cancer s/p radiation therapy.\par \par This is a 76 year-old retired gentleman with elevated PSA and prostate cancer status post radiation therapy. His prostate biopsy in September 2019 was positive for [place] 7 adenocarcinoma of the prostate involving 2 of 12 cores. He returns today for follow-up. Since he was last seen, the patient denies any interval complaints or difficulties. The patient reports that he is overall well. He denies any pain. He denies any changes in health. The patient is entirely asymptomatic. All other review of systems are negative. Past medical history, family history and social history were unchanged. Medications and allergies were reviewed. Patient is allergic to Penicillin and Sulfa. \par \par On examination, the patient is a healthy-appearing gentleman in no acute distress. He is alert and oriented follows commands. He has normal mood and affect. He is normocephalic. Neck is supple. Oral no thrush. Respirations are unlabored. His abdomen is soft and nontender. Bladder is nonpalpable. No CVA tenderness. Neurologically he is grossly intact. No peripheral edema. Skin without gross abnormality. He has normal male external genitalia. Normal meatus. Bilateral testes are descended intrascrotally and normal to palpation. On rectal examination, there is normal sphincter tone. The prostate is clinically benign without focal induration or nodularity.\par \par His recent BMP demonstrated normal renal functions, creatinine 1.01. His PSA in June was 0.104, which is stable. His previous PSA was 0.46.\par \par Assessment: Elevated PSA. Prostate cancer s/p radiation therapy.\par \par I counseled the patient. He is doing well. In terms of his prostate cancer, his PSA is stable and within normal limits.  He will follow-up in 1 year for PSA testing to ensure stability. Patient understands that if he develops gross hematuria or any urinary discomfort, he will contact me for further evaluation. Risks and alternatives were discussed. I answered the patient questions. The patient will follow-up as directed and will contact me with any questions or concerns. Thank you for the opportunity to participate in the care of Mr. LINDSAY. I will keep you updated on his progress.\par \par Plan: Follow-up in 1 year.

## 2024-02-29 NOTE — H&P PST ADULT - TEACHING/LEARNING EDUCATIONAL LEVEL
Detail Level: Detailed Quality 226: Preventive Care And Screening: Tobacco Use: Screening And Cessation Intervention: Patient screened for tobacco use and is an ex/non-smoker [facility]

## 2024-07-24 ENCOUNTER — APPOINTMENT (OUTPATIENT)
Dept: RADIATION ONCOLOGY | Facility: CLINIC | Age: 79
End: 2024-07-24
Payer: MEDICARE

## 2024-07-24 VITALS
OXYGEN SATURATION: 94 % | SYSTOLIC BLOOD PRESSURE: 136 MMHG | DIASTOLIC BLOOD PRESSURE: 76 MMHG | RESPIRATION RATE: 17 BRPM | HEART RATE: 77 BPM | TEMPERATURE: 207.5 F | BODY MASS INDEX: 32.14 KG/M2 | WEIGHT: 188.27 LBS | HEIGHT: 64 IN

## 2024-07-24 DIAGNOSIS — C61 MALIGNANT NEOPLASM OF PROSTATE: ICD-10-CM

## 2024-07-24 PROCEDURE — 99214 OFFICE O/P EST MOD 30 MIN: CPT

## 2024-07-24 NOTE — REVIEW OF SYSTEMS
[IPSS Score (0-40): ___] : IPSS score: [unfilled] [EPIC-CP Score (0-60): ___] : EPIC-CP score: [unfilled] [Negative] : Endocrine [Hematuria: Grade 0] : Hematuria: Grade 0 [Urinary Incontinence: Grade 1 - Occasional (e.g., with coughing, sneezing, etc.), pads not indicated] : Urinary Incontinence: Grade 1 - Occasional (e.g., with coughing, sneezing, etc.), pads not indicated [Urinary Retention: Grade 0] : Urinary Retention: Grade 0 [Urinary Tract Pain: Grade 0] : Urinary Tract Pain: Grade 0 [Urinary Urgency: Grade 0] : Urinary Urgency: Grade 0 [Urinary Frequency: Grade 0] : Urinary Frequency: Grade 0 [Ejaculation Disorder: Grade 0] : Ejaculation Disorder: Grade 0 [Erectile Dysfunction: Grade 0] : Erectile Dysfunction: Grade 0 [Nocturia] : nocturia [Urinary Frequency] : no urinary frequency

## 2024-07-24 NOTE — HISTORY OF PRESENT ILLNESS
[FreeTextEntry1] : A 75-year-old man with a diagnosis of a [id number], Scottie 3+4=7, pre-treatment PSA of 6.44 ng/mL, NCCN favorable [id number]-old man with a diagnosis of a [id number], Scottie 3+4=7, pre-treatment PSA of 6.44 ng/mL, NCCN favorable intermediate-risk prostate cancer. He underwent SBRT between 12/20/19 - 1/2/20 to a dose of 4250 cGy. He tolerated radiation treatment well without developing any grade 3 or higher acute toxicity as a result of his treatment.  PSA: 3/11/20   -  0.81 6/10/20   -  0.46 12/18/20 -  0.40 6/19/21   -  0.201 12/14/21 -  0.104 12/5/22  -   0.034 7/4/23    -   0.022 10/4/23  -   0.027 1/10/24  -   0.033  The patient presents today for a follow-up. Patient is pleased with his urinary quality of life. He reports nocturia twice per night and occasional dribbling. wears one pad for protection when he goes out.and he is sexually active.

## 2024-07-24 NOTE — HISTORY OF PRESENT ILLNESS
[FreeTextEntry1] : A 75-year-old man with a diagnosis of a [id number], Scottie 3+4=7, pre-treatment PSA of 6.44 ng/mL, NCCN favorable [id number]-old man with a diagnosis of a [id number], Scottie 3+4=7, pre-treatment PSA of 6.44 ng/mL, NCCN favorable intermediate-risk prostate cancer. He underwent SBRT between 12/20/19 - 1/2/20 to a dose of 4250 cGy. He tolerated radiation treatment well without developing any grade 3 or higher acute toxicity as a result of his treatment.  PSA: 3/11/20   -  0.81 6/10/20   -  0.46 12/18/20 -  0.40 6/19/21   -  0.201 12/14/21 -  0.104 12/5/22  -   0.034 7/4/23    -   0.022 10/4/23  -   0.027 1/10/24  -   0.033  The patient presents today for a follow-up. Patient is pleased with his urinary quality of life. He reports nocturia twice per night and occasional dribbling. wears one pad for protection when he goes out.and he is sexually active.

## 2024-07-24 NOTE — DISEASE MANAGEMENT
[1] : T1 [c] : c [0] : M0 [0-10] : 0 -10 ng/mL [IIB] : IIB [Radiation Therapy] : Radiation Therapy [EBRT] : EBRT [TotalCores] : 12 [TotalPositiveCores] : 2 [MaxCoreInvolvement] : 80% [RadiationCompletedDate] : 1/2/20 [EBRTDose] : 7635 [EBRTFractions] : 5

## 2024-07-24 NOTE — DISEASE MANAGEMENT
[1] : T1 [c] : c [0] : M0 [0-10] : 0 -10 ng/mL [IIB] : IIB [Radiation Therapy] : Radiation Therapy [EBRT] : EBRT [TotalCores] : 12 [TotalPositiveCores] : 2 [MaxCoreInvolvement] : 80% [RadiationCompletedDate] : 1/2/20 [EBRTDose] : 4507 [EBRTFractions] : 5

## 2025-01-09 ENCOUNTER — APPOINTMENT (OUTPATIENT)
Dept: UROLOGY | Facility: CLINIC | Age: 80
End: 2025-01-09
Payer: MEDICARE

## 2025-01-09 PROCEDURE — 99213 OFFICE O/P EST LOW 20 MIN: CPT

## 2025-01-09 PROCEDURE — G2211 COMPLEX E/M VISIT ADD ON: CPT

## 2025-07-21 ENCOUNTER — NON-APPOINTMENT (OUTPATIENT)
Age: 80
End: 2025-07-21

## 2025-07-23 ENCOUNTER — APPOINTMENT (OUTPATIENT)
Dept: RADIATION ONCOLOGY | Facility: CLINIC | Age: 80
End: 2025-07-23
Payer: MEDICARE

## 2025-07-23 VITALS
BODY MASS INDEX: 31.94 KG/M2 | WEIGHT: 186.07 LBS | TEMPERATURE: 98.42 F | RESPIRATION RATE: 17 BRPM | OXYGEN SATURATION: 95 % | SYSTOLIC BLOOD PRESSURE: 137 MMHG | HEART RATE: 72 BPM | DIASTOLIC BLOOD PRESSURE: 82 MMHG

## 2025-07-23 DIAGNOSIS — C61 MALIGNANT NEOPLASM OF PROSTATE: ICD-10-CM

## 2025-07-23 PROCEDURE — 99214 OFFICE O/P EST MOD 30 MIN: CPT
